# Patient Record
Sex: MALE | Race: WHITE | NOT HISPANIC OR LATINO | Employment: UNEMPLOYED | URBAN - METROPOLITAN AREA
[De-identification: names, ages, dates, MRNs, and addresses within clinical notes are randomized per-mention and may not be internally consistent; named-entity substitution may affect disease eponyms.]

---

## 2018-01-06 ENCOUNTER — GENERIC CONVERSION - ENCOUNTER (OUTPATIENT)
Dept: OTHER | Facility: OTHER | Age: 48
End: 2018-01-06

## 2018-01-06 ENCOUNTER — OFFICE VISIT (OUTPATIENT)
Dept: URGENT CARE | Facility: CLINIC | Age: 48
End: 2018-01-06
Payer: COMMERCIAL

## 2018-01-06 PROCEDURE — 99283 EMERGENCY DEPT VISIT LOW MDM: CPT

## 2018-01-06 PROCEDURE — G0382 LEV 3 HOSP TYPE B ED VISIT: HCPCS

## 2018-01-22 VITALS
WEIGHT: 146.38 LBS | DIASTOLIC BLOOD PRESSURE: 75 MMHG | BODY MASS INDEX: 22.98 KG/M2 | TEMPERATURE: 99 F | HEIGHT: 67 IN | OXYGEN SATURATION: 97 % | HEART RATE: 100 BPM | SYSTOLIC BLOOD PRESSURE: 132 MMHG | RESPIRATION RATE: 18 BRPM

## 2019-09-27 ENCOUNTER — HOSPITAL ENCOUNTER (EMERGENCY)
Facility: HOSPITAL | Age: 49
Discharge: HOME | End: 2019-09-28
Attending: EMERGENCY MEDICINE | Admitting: EMERGENCY MEDICINE

## 2019-09-27 VITALS
SYSTOLIC BLOOD PRESSURE: 144 MMHG | RESPIRATION RATE: 18 BRPM | TEMPERATURE: 98.6 F | HEART RATE: 92 BPM | BODY MASS INDEX: 23.49 KG/M2 | OXYGEN SATURATION: 97 % | WEIGHT: 150 LBS | DIASTOLIC BLOOD PRESSURE: 90 MMHG

## 2019-09-27 DIAGNOSIS — M25.512 LEFT SHOULDER PAIN: Primary | ICD-10-CM

## 2019-09-27 PROCEDURE — 99284 EMERGENCY DEPT VISIT MOD MDM: CPT

## 2019-09-27 PROCEDURE — 99284 EMERGENCY DEPT VISIT MOD MDM: CPT | Performed by: EMERGENCY MEDICINE

## 2019-09-27 RX ORDER — IBUPROFEN 600 MG/1
600 TABLET ORAL ONCE
Status: COMPLETED | OUTPATIENT
Start: 2019-09-28 | End: 2019-09-28

## 2019-09-27 RX ORDER — FAMOTIDINE 20 MG/1
40 TABLET, FILM COATED ORAL ONCE
Status: COMPLETED | OUTPATIENT
Start: 2019-09-28 | End: 2019-09-28

## 2019-09-28 ENCOUNTER — APPOINTMENT (EMERGENCY)
Dept: RADIOLOGY | Facility: HOSPITAL | Age: 49
End: 2019-09-28

## 2019-09-28 LAB
GLUCOSE SERPL-MCNC: 100 MG/DL (ref 65–140)
TROPONIN I SERPL-MCNC: <0.02 NG/ML

## 2019-09-28 PROCEDURE — 82948 REAGENT STRIP/BLOOD GLUCOSE: CPT

## 2019-09-28 PROCEDURE — 71045 X-RAY EXAM CHEST 1 VIEW: CPT

## 2019-09-28 PROCEDURE — 93005 ELECTROCARDIOGRAM TRACING: CPT

## 2019-09-28 PROCEDURE — 84484 ASSAY OF TROPONIN QUANT: CPT | Performed by: EMERGENCY MEDICINE

## 2019-09-28 PROCEDURE — 36415 COLL VENOUS BLD VENIPUNCTURE: CPT | Performed by: EMERGENCY MEDICINE

## 2019-09-28 PROCEDURE — 73030 X-RAY EXAM OF SHOULDER: CPT

## 2019-09-28 RX ADMIN — IBUPROFEN 600 MG: 600 TABLET ORAL at 00:19

## 2019-09-28 RX ADMIN — FAMOTIDINE 40 MG: 20 TABLET ORAL at 00:19

## 2019-09-28 NOTE — ED NOTES
Pt ambulated steadily without assistance  Offered to call ride for pt  Pt states he does not wish for anyone to be called for a ride home        289 Drea Vidal RN  09/28/19 2106

## 2019-09-28 NOTE — ED PROVIDER NOTES
History  Chief Complaint   Patient presents with    Shoulder Pain     pain left shoulder, chronic  38-year-old male presents by ambulance from police department with complaint of wanting to get checked out  States goes by [employer]"  Earlier he was involved in a verbal altercation at a local drinking establishment and picked up by police his vehicle was impounded and then while being evaluated at the police department he made several requests to get checked out; EMS was activated he had no specific complaint for the ambulance crew other than being intoxicated  Patient admits to drinking beer he uses this to [facility] his left shoulder pain  States this chronic  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  There is no history of any trauma or falls  Patient denies any numbness or tingling  He has no neck pain  No weakness  He denies any nausea vomiting no chest pain or shortness of breath no abdominal pain  While here his main concern is to be able to smoke a cigarette  Patient does not take any medications on a regular basis he has no known drug allergies  VS enroute HR 86-88 R18 /78 sats 98% RA          None       No past medical history on file  No past surgical history on file  No family history on file  I have reviewed and agree with the history as documented  Social History     Tobacco Use    Smoking status: Current Every Day Smoker    Smokeless tobacco: Never Used   Substance Use Topics    Alcohol use: Yes    Drug use: Never        Review of Systems   Constitutional: Negative for chills and fever  HENT: Negative for congestion, ear pain, sinus pressure, sinus pain, sore throat and trouble swallowing  Eyes: Negative for discharge and visual disturbance  Respiratory: Negative for cough and shortness of breath  Cardiovascular: Negative for chest pain and leg swelling  Gastrointestinal: Negative for abdominal pain, nausea and vomiting     Genitourinary: Negative for difficulty urinating  Musculoskeletal: Negative for neck pain and neck stiffness  Skin: Negative for rash and wound  Neurological: Negative for dizziness, weakness, light-headedness and headaches  Psychiatric/Behavioral: The patient is not nervous/anxious  All other systems reviewed and are negative  Physical Exam  Physical Exam   Constitutional: He appears well-developed  No distress  HENT:   Head: Normocephalic and atraumatic  Right Ear: External ear normal    Left Ear: External ear normal    Nose: Nose normal    Mouth/Throat: Oropharynx is clear and moist  No oropharyngeal exudate  TMS pale   Eyes: Pupils are equal, round, and reactive to light  Conjunctivae and EOM are normal  Right eye exhibits no discharge  Left eye exhibits no discharge  Neck: Normal range of motion  Neck supple  No midline or CVA tenderness   Cardiovascular: Normal rate, regular rhythm, normal heart sounds and intact distal pulses  Pulmonary/Chest: Effort normal    Distant BS   Abdominal: Soft  Bowel sounds are normal  He exhibits no distension and no mass  There is no tenderness  There is no rebound and no guarding  Musculoskeletal: He exhibits tenderness (left deltoid region)  He exhibits no edema or deformity  Left shoulder: He exhibits decreased range of motion, tenderness, bony tenderness and pain  He exhibits no swelling, no effusion, no crepitus, no deformity, no laceration, no spasm, normal pulse and normal strength  Left hand: Normal sensation noted  Decreased sensation is not present in the ulnar distribution, is not present in the medial redistribution and is not present in the radial distribution  Normal strength noted  He exhibits no finger abduction, no thumb/finger opposition and no wrist extension trouble  Left upper extremity 2+ radial pulse  Patient has equal hand  bilaterally    Patient able to abduct shoulder is 90° flex and extend to 45° there is no pain with internal external rotation with the shoulder abduct it  No evidence of effusion  No skin deformity or rash  His note range-of-motion the elbows intact no tenderness the distal humerus  Lymphadenopathy:     He has no cervical adenopathy  Neurological: He is alert  He displays normal reflexes  No cranial nerve deficit or sensory deficit  He exhibits normal muscle tone  Coordination normal    GCS 15 oriented to self place     Skin: Skin is warm and dry  Capillary refill takes less than 2 seconds  He is not diaphoretic  Psychiatric:   flat   Vitals reviewed  Vital Signs  ED Triage Vitals [19]   Temperature Pulse Respirations Blood Pressure SpO2   98 6 °F (37 °C) 92 18 144/90 97 %      Temp Source Heart Rate Source Patient Position - Orthostatic VS BP Location FiO2 (%)   Temporal Monitor Sitting Left arm --      Pain Score       9           Vitals:    19   BP: 144/90   Pulse: 92   Patient Position - Orthostatic VS: Sitting         Visual Acuity      ED Medications  Medications   ibuprofen (MOTRIN) tablet 600 mg (600 mg Oral Given 19)   famotidine (PEPCID) tablet 40 mg (40 mg Oral Given 19)       Diagnostic Studies  Results Reviewed     Procedure Component Value Units Date/Time    Troponin I [[id number]]  (Normal) Collected:  19    Lab Status:  Final result Specimen:  Blood from Arm, Right Updated:  19     Troponin I <0 02 ng/mL     Fingerstick Glucose (POCT) [[id number]]  (Normal) Collected:  19    Lab Status:  Final result Updated:  19     POC Glucose 100 mg/dl                  XR shoulder 2+ views LEFT   ED Interpretation by Arin Murillo MD ( [id number])   Read by me; Radiologist to provide formal interpretation   No acute fx or dislocation smooth corticated ossicle  Near inferior glenoid      XR chest 1 view portable   ED Interpretation by Arin Murillo MD ( [id number])   Read by me; Radiologist to provide formal interpretation  No acute process                 Procedures  ECG 12 Lead Documentation Only  Date/Time: 9/28/2019 1:32 AM  Performed by: Rizwan Keene MD  Authorized by: Rizwan Keene MD     Indications / Diagnosis:  Left shoulder pain  ECG reviewed by me, the ED Provider: yes    Patient location:  ED  Previous ECG:     Previous ECG:  Unavailable  Rate:     ECG rate:  84    ECG rate assessment: normal    Rhythm:     Rhythm: sinus rhythm    QRS:     QRS axis:  Normal  Comments:      No acute ischemic changes           ED Course  ED Course as of Sep 28 0300   Sat Sep 28, 2019   0134 Dr Luanne Keen assumes care; requires gait check prior to discharge      0201 Ambulates with steady gait to bathroom able to stretch and reach both arms above his head; Offered phone declines to have emergency contacted for ride            HEART Risk Score      Most Recent Value   History  0 Filed at: 09/28/2019 0146   ECG  1 Filed at: 09/28/2019 0146   Age  1 Filed at: 09/28/2019 0146   Risk Factors  1 Filed at: 09/28/2019 0146   Troponin  0 Filed at: 09/28/2019 0146   Heart Score Risk Calculator   History  0 Filed at: 09/28/2019 0146   ECG  1 Filed at: 09/28/2019 0146   Age  1 Filed at: 09/28/2019 0146   Risk Factors  1 Filed at: 09/28/2019 0146   Troponin  0 Filed at: 09/28/2019 0146   HEART Score  3 Filed at: 09/28/2019 0146   HEART Score  3 Filed at: 09/28/2019 0146                            [facility]  Number of Diagnoses or Management Options  Left shoulder pain:   Diagnosis management comments: Mdm: chronic left shoulder pain no clinical evidence of referred pain   Will eval for acs, pulmonary process initiate pain management with NSAIDs    At discharge provided with [facility] drug and alcohol program assessment providers      Disposition  Final diagnoses:   Left shoulder pain     Time reflects when diagnosis was documented in both MDM as applicable and the Disposition within this note     Time User Action Codes Description Comment    9/28/2019  1:50 AM Efraín Monge Add [C68 109] Left shoulder pain       ED Disposition     ED Disposition Condition Date/Time Comment    Discharge Stable Sat Sep 28, 2019  1:51 AM Jacob Rivers discharge to home/self care  Follow-up Information     Follow up With Specialties Details Why Contact Info Additional [phone] [facility] Specialists [facility] Call  recheck of left shoulder pain [address] [facility] [address]  [address] [place] [address] Specialists Carlos Saab [address] [place], [place], [address]          There are no discharge medications for this patient  No discharge procedures on file      ED Provider  Electronically Signed by           Cyndy Carlson MD  09/28/19 5914

## 2019-09-28 NOTE — DISCHARGE INSTRUCTIONS
ibuprofen 200-800mg every 8 hours with food as needed for pain   No drinking driving or heavy machinery use for 24 hours after discharge

## 2019-10-01 LAB
ATRIAL RATE: 84 BPM
P AXIS: 69 DEGREES
PR INTERVAL: 140 MS
QRS AXIS: 71 DEGREES
QRSD INTERVAL: 92 MS
QT INTERVAL: 382 MS
QTC INTERVAL: 451 MS
T WAVE AXIS: 60 DEGREES
VENTRICULAR RATE: 84 BPM

## 2019-10-01 PROCEDURE — 93010 ELECTROCARDIOGRAM REPORT: CPT | Performed by: INTERNAL MEDICINE
